# Patient Record
Sex: MALE | Race: WHITE | URBAN - METROPOLITAN AREA
[De-identification: names, ages, dates, MRNs, and addresses within clinical notes are randomized per-mention and may not be internally consistent; named-entity substitution may affect disease eponyms.]

---

## 2024-11-18 ENCOUNTER — OFFICE VISIT (OUTPATIENT)
Dept: URBAN - METROPOLITAN AREA CLINIC 78 | Facility: CLINIC | Age: 49
End: 2024-11-18
Payer: COMMERCIAL

## 2024-11-18 ENCOUNTER — DASHBOARD ENCOUNTERS (OUTPATIENT)
Age: 49
End: 2024-11-18

## 2024-11-18 VITALS
WEIGHT: 215 LBS | HEIGHT: 70 IN | DIASTOLIC BLOOD PRESSURE: 89 MMHG | TEMPERATURE: 97.9 F | HEART RATE: 72 BPM | SYSTOLIC BLOOD PRESSURE: 149 MMHG | BODY MASS INDEX: 30.78 KG/M2

## 2024-11-18 DIAGNOSIS — F41.9 ANXIETY: ICD-10-CM

## 2024-11-18 DIAGNOSIS — K64.8 INTERNAL HEMORRHOIDS: ICD-10-CM

## 2024-11-18 DIAGNOSIS — Z12.11 COLON CANCER SCREENING: ICD-10-CM

## 2024-11-18 DIAGNOSIS — E66.811 OBESITY, CLASS I, BMI 30-34.9: ICD-10-CM

## 2024-11-18 DIAGNOSIS — Z86.19 HISTORY OF HELICOBACTER PYLORI INFECTION: ICD-10-CM

## 2024-11-18 PROBLEM — 48694002: Status: ACTIVE | Noted: 2024-11-18

## 2024-11-18 PROCEDURE — 99204 OFFICE O/P NEW MOD 45 MIN: CPT | Performed by: INTERNAL MEDICINE

## 2024-11-18 PROCEDURE — 99244 OFF/OP CNSLTJ NEW/EST MOD 40: CPT | Performed by: INTERNAL MEDICINE

## 2024-11-18 RX ORDER — LISINOPRIL 20 MG/1
TAKE ONE TABLET BY MOUTH ONE TIME DAILY FOR HIGH BLOOD PRESSURE TABLET ORAL
Qty: 90 UNSPECIFIED | Refills: 1 | Status: ACTIVE | COMMUNITY

## 2024-11-18 RX ORDER — DICYCLOMINE HYDROCHLORIDE 20 MG/1
TAKE ONE TABLET BY MOUTH FOUR TIMES A DAY AS NEEDED FOR 5 DAYS, FOR ABDOMINAL PAIN TABLET ORAL
Qty: 20 UNSPECIFIED | Refills: 0 | Status: ON HOLD | COMMUNITY

## 2024-11-18 NOTE — HPI-TODAY'S VISIT:
The patient was referred to us by Barbara Bradshaw for a screening colonoscopy. A copy of this note will be sent to the referring physician.   The patient has never had a colonoscopy previously. There is no FH of colon cancer or colon polyps.   The patient has no pertinent additional complaints of abdominal pain, constipation, diarrhea, bloating, anorexia or unintentional weight loss.  He had an isolated episode of rectal bleeding a couple of weeks ago. This had never happened before and has not recurred since. He feels that this was related to hemorrhoids, although does recall that he had eaten incompletely cooked meat the night before.  Regarding any upper GI complaints, the patient has not had heartburn, nausea, vomiting or dysphagia.  He has a remote history of H pylori infetion diagnosed ~ 2020. This was not diagnosed until 2021. He was treated with 2 different antibiotics. He believes he was told there was eradication of infection, but is not 100% certain.   The patient does not take blood thinners.  They deny any CP or JIN. He goes to the gym anywhere between 2-4 times a week. He had a negative stress test in 2022.  The patient is not on medications for weight loss.

## 2024-11-21 ENCOUNTER — OFFICE VISIT (OUTPATIENT)
Dept: URBAN - METROPOLITAN AREA LAB 1 | Facility: LAB | Age: 49
End: 2024-11-21
Payer: COMMERCIAL

## 2024-11-21 DIAGNOSIS — K63.5 HYPERPLASTIC COLON POLYP: ICD-10-CM

## 2024-11-21 DIAGNOSIS — Z12.11 COLON CANCER SCREENING: ICD-10-CM

## 2024-11-21 DIAGNOSIS — K64.1 GRADE II INTERNAL HEMORRHOIDS: ICD-10-CM

## 2024-11-21 DIAGNOSIS — D12.5 ADENOMA OF SIGMOID COLON: ICD-10-CM

## 2024-11-21 PROCEDURE — 45385 COLONOSCOPY W/LESION REMOVAL: CPT | Performed by: INTERNAL MEDICINE

## 2024-11-21 PROCEDURE — 46221 LIGATION OF HEMORRHOID(S): CPT | Performed by: INTERNAL MEDICINE

## 2024-11-21 RX ORDER — LISINOPRIL 20 MG/1
TAKE ONE TABLET BY MOUTH ONE TIME DAILY FOR HIGH BLOOD PRESSURE TABLET ORAL
Qty: 90 UNSPECIFIED | Refills: 1 | Status: ACTIVE | COMMUNITY

## 2024-11-21 RX ORDER — DICYCLOMINE HYDROCHLORIDE 20 MG/1
TAKE ONE TABLET BY MOUTH FOUR TIMES A DAY AS NEEDED FOR 5 DAYS, FOR ABDOMINAL PAIN TABLET ORAL
Qty: 20 UNSPECIFIED | Refills: 0 | Status: ON HOLD | COMMUNITY

## 2024-12-02 ENCOUNTER — LAB OUTSIDE AN ENCOUNTER (OUTPATIENT)
Dept: URBAN - METROPOLITAN AREA CLINIC 78 | Facility: CLINIC | Age: 49
End: 2024-12-02

## 2024-12-08 LAB
HP SPECIMEN SOURCE: (no result)
PERFORMING LAB: (no result)

## 2024-12-09 ENCOUNTER — TELEPHONE ENCOUNTER (OUTPATIENT)
Dept: URBAN - METROPOLITAN AREA CLINIC 78 | Facility: CLINIC | Age: 49
End: 2024-12-09